# Patient Record
Sex: FEMALE | Race: WHITE
[De-identification: names, ages, dates, MRNs, and addresses within clinical notes are randomized per-mention and may not be internally consistent; named-entity substitution may affect disease eponyms.]

---

## 2019-07-13 ENCOUNTER — HOSPITAL ENCOUNTER (OUTPATIENT)
Dept: HOSPITAL 5 - TRG | Age: 24
Discharge: HOME | End: 2019-07-13
Attending: OBSTETRICS & GYNECOLOGY
Payer: COMMERCIAL

## 2019-07-13 VITALS — DIASTOLIC BLOOD PRESSURE: 71 MMHG | SYSTOLIC BLOOD PRESSURE: 115 MMHG

## 2019-07-13 DIAGNOSIS — O21.2: ICD-10-CM

## 2019-07-13 DIAGNOSIS — Z3A.39: ICD-10-CM

## 2019-07-13 DIAGNOSIS — O36.8130: ICD-10-CM

## 2019-07-13 DIAGNOSIS — O26.853: Primary | ICD-10-CM

## 2019-07-13 LAB
BASOPHILS # (AUTO): 0 K/MM3 (ref 0–0.1)
BASOPHILS NFR BLD AUTO: 0.3 % (ref 0–1.8)
EOSINOPHIL # BLD AUTO: 0.1 K/MM3 (ref 0–0.4)
EOSINOPHIL NFR BLD AUTO: 0.8 % (ref 0–4.3)
HCT VFR BLD CALC: 39.8 % (ref 30.3–42.9)
HGB BLD-MCNC: 13.5 GM/DL (ref 10.1–14.3)
LYMPHOCYTES # BLD AUTO: 1.5 K/MM3 (ref 1.2–5.4)
LYMPHOCYTES NFR BLD AUTO: 14.3 % (ref 13.4–35)
MCH RBC QN AUTO: 30 PG (ref 28–32)
MCHC RBC AUTO-ENTMCNC: 34 % (ref 30–34)
MCV RBC AUTO: 88 FL (ref 79–97)
MONOCYTES # (AUTO): 0.7 K/MM3 (ref 0–0.8)
MONOCYTES % (AUTO): 6.5 % (ref 0–7.3)
PLATELET # BLD: 216 K/MM3 (ref 140–440)
RBC # BLD AUTO: 4.55 M/MM3 (ref 3.65–5.03)

## 2019-07-13 PROCEDURE — 86850 RBC ANTIBODY SCREEN: CPT

## 2019-07-13 PROCEDURE — 76815 OB US LIMITED FETUS(S): CPT

## 2019-07-13 PROCEDURE — 86592 SYPHILIS TEST NON-TREP QUAL: CPT

## 2019-07-13 PROCEDURE — 36415 COLL VENOUS BLD VENIPUNCTURE: CPT

## 2019-07-13 PROCEDURE — 76819 FETAL BIOPHYS PROFIL W/O NST: CPT

## 2019-07-13 PROCEDURE — 96360 HYDRATION IV INFUSION INIT: CPT

## 2019-07-13 PROCEDURE — 86901 BLOOD TYPING SEROLOGIC RH(D): CPT

## 2019-07-13 PROCEDURE — 85730 THROMBOPLASTIN TIME PARTIAL: CPT

## 2019-07-13 PROCEDURE — 86900 BLOOD TYPING SEROLOGIC ABO: CPT

## 2019-07-13 PROCEDURE — 85025 COMPLETE CBC W/AUTO DIFF WBC: CPT

## 2019-07-13 PROCEDURE — 96361 HYDRATE IV INFUSION ADD-ON: CPT

## 2019-07-13 PROCEDURE — 59020 FETAL CONTRACT STRESS TEST: CPT

## 2019-07-13 NOTE — ULTRASOUND REPORT
Limited obstetrical ultrasound



INDICATION: Term pregnancy, nausea, vomiting



Limited evaluation shows an intrauterine term pregnancy. Fetus is in a cephalic position. Cardiac act
ivity was documented at 158 bpm needle heart rate. Placenta is fundal and free of the internal cervic
al os. No obvious placental abnormalities are seen. No evidence of placental reduction is noted. Amni
otic fluid volume appears acceptable for this stage of pregnancy. DONN is within normal range at 9.0 c
m. Fetal aging was not performed and I do not have a full survey of anatomical details.



Fetal biophysical profile, nonstress



All components were within normal limits with fetal biophysical profile score of 8/8.



IMPRESSION: Term pregnancy without obvious abnormality seen on limited study



Signer Name: Bc Carson MD 

Signed: 7/13/2019 1:33 PM

 Workstation Name: [id number]

## 2019-07-13 NOTE — ULTRASOUND REPORT
Limited obstetrical ultrasound



INDICATION: Term pregnancy, nausea, vomiting



Limited evaluation shows an intrauterine term pregnancy. Fetus is in a cephalic position. Cardiac act
ivity was documented at 158 bpm needle heart rate. Placenta is fundal and free of the internal cervic
al os. No obvious placental abnormalities are seen. No evidence of placental reduction is noted. Amni
otic fluid volume appears acceptable for this stage of pregnancy. DONN is within normal range at 9.0 c
m. Fetal aging was not performed and I do not have a full survey of anatomical details.



Fetal biophysical profile, nonstress



All components were within normal limits with fetal biophysical profile score of 8/8.



IMPRESSION: Term pregnancy without obvious abnormality seen on limited study



Signer Name: Bc Carson MD 

Signed: 7/13/2019 1:33 PM

 Workstation Name: [id number]

## 2019-07-14 ENCOUNTER — HOSPITAL ENCOUNTER (INPATIENT)
Dept: HOSPITAL 5 - TRG | Age: 24
LOS: 2 days | Discharge: HOME | End: 2019-07-16
Attending: OBSTETRICS & GYNECOLOGY | Admitting: OBSTETRICS & GYNECOLOGY
Payer: COMMERCIAL

## 2019-07-14 DIAGNOSIS — Z23: ICD-10-CM

## 2019-07-14 DIAGNOSIS — Z79.899: ICD-10-CM

## 2019-07-14 DIAGNOSIS — Z3A.39: ICD-10-CM

## 2019-07-14 DIAGNOSIS — K21.9: ICD-10-CM

## 2019-07-14 DIAGNOSIS — R00.0: ICD-10-CM

## 2019-07-14 DIAGNOSIS — Z88.0: ICD-10-CM

## 2019-07-14 DIAGNOSIS — E66.9: ICD-10-CM

## 2019-07-14 LAB
HCT VFR BLD CALC: 37.9 % (ref 30.3–42.9)
HGB BLD-MCNC: 12.8 GM/DL (ref 10.1–14.3)
MCHC RBC AUTO-ENTMCNC: 34 % (ref 30–34)
MCV RBC AUTO: 86 FL (ref 79–97)
PLATELET # BLD: 211 K/MM3 (ref 140–440)
RBC # BLD AUTO: 4.4 M/MM3 (ref 3.65–5.03)

## 2019-07-14 PROCEDURE — 86850 RBC ANTIBODY SCREEN: CPT

## 2019-07-14 PROCEDURE — 36415 COLL VENOUS BLD VENIPUNCTURE: CPT

## 2019-07-14 PROCEDURE — 90715 TDAP VACCINE 7 YRS/> IM: CPT

## 2019-07-14 PROCEDURE — 86901 BLOOD TYPING SEROLOGIC RH(D): CPT

## 2019-07-14 PROCEDURE — 85014 HEMATOCRIT: CPT

## 2019-07-14 PROCEDURE — 3E0R3BZ INTRODUCTION OF ANESTHETIC AGENT INTO SPINAL CANAL, PERCUTANEOUS APPROACH: ICD-10-PCS | Performed by: OBSTETRICS & GYNECOLOGY

## 2019-07-14 PROCEDURE — 00HU33Z INSERTION OF INFUSION DEVICE INTO SPINAL CANAL, PERCUTANEOUS APPROACH: ICD-10-PCS | Performed by: OBSTETRICS & GYNECOLOGY

## 2019-07-14 PROCEDURE — 86900 BLOOD TYPING SEROLOGIC ABO: CPT

## 2019-07-14 PROCEDURE — 85027 COMPLETE CBC AUTOMATED: CPT

## 2019-07-14 PROCEDURE — 85018 HEMOGLOBIN: CPT

## 2019-07-14 PROCEDURE — 90471 IMMUNIZATION ADMIN: CPT

## 2019-07-14 PROCEDURE — 86592 SYPHILIS TEST NON-TREP QUAL: CPT

## 2019-07-14 PROCEDURE — 0HQ9XZZ REPAIR PERINEUM SKIN, EXTERNAL APPROACH: ICD-10-PCS | Performed by: OBSTETRICS & GYNECOLOGY

## 2019-07-14 PROCEDURE — 88307 TISSUE EXAM BY PATHOLOGIST: CPT

## 2019-07-14 RX ADMIN — SODIUM CHLORIDE, SODIUM LACTATE, POTASSIUM CHLORIDE, AND CALCIUM CHLORIDE SCH MLS/HR: .6; .31; .03; .02 INJECTION, SOLUTION INTRAVENOUS at 06:40

## 2019-07-14 RX ADMIN — SODIUM CHLORIDE, SODIUM LACTATE, POTASSIUM CHLORIDE, AND CALCIUM CHLORIDE SCH MLS/HR: .6; .31; .03; .02 INJECTION, SOLUTION INTRAVENOUS at 07:49

## 2019-07-14 RX ADMIN — IBUPROFEN SCH MG: 600 TABLET, FILM COATED ORAL at 23:48

## 2019-07-14 RX ADMIN — IBUPROFEN SCH MG: 600 TABLET, FILM COATED ORAL at 17:51

## 2019-07-14 RX ADMIN — DOCUSATE SODIUM SCH MG: 100 CAPSULE, LIQUID FILLED ORAL at 22:01

## 2019-07-14 NOTE — ANESTHESIA CONSULTATION
Anesthesia Consult and Med Hx


Date of service: 07/14/19





- Airway


Anesthetic Teeth Evaluation: Good


ROM Head & Neck: Adequate


Mental/Hyoid Distance: Adequate


Mallampati Class: Class II


Intubation Access Assessment: Probably Good





- Pulmonary Exam


CTA: Yes





- Cardiac Exam


Cardiac Exam: RRR





- Pre-Operative Health Status


ASA Pre-Surgery Classification: ASA2


Proposed Anesthetic Plan: Epidural





- Pulmonary


Hx Smoking: No


Hx Asthma: No


Hx Respiratory Symptoms: No


SOB: No


COPD: No


Home Oxygen Therapy: No


Hx Pneumonia: No


Hx Sleep Apnea: No





- Cardiovascular System


Hx Hypertension: No


Hx Coronary Artery Disease: No


Hx Heart Attack/AMI: No


Hx Angina: No


Hx Percutaneous Transluminal Coronary Angioplasty (PTCA): No


Hx Cardia Arrhythmia: No


Hx Pacemaker: No


Hx Internal Defibrillator: No


Hx Valvular Heart Disease: No


Hx Heart Murmur: No


Hx Peripheral Vascular Disease: No





- Central Nervous System


Hx Neuromuscular Disorder: No


Hx Seizures: No


CVA: No


Hx Back Pain: No


Hx Psychiatric Problems: No





- Gastrointestinal


Hx Ulcer: No


Hx Gastroesophageal Reflux Disease: Yes





- Endocrine


Hx Renal Disease: No


Hx End Stage Renal Disease: No


Hx Cirrhosis: No


Hx Liver Disease: No


Hx Insulin Dependent Diabetes: No


Hx Non-Insulin Dependent Diabetes: No


Hx Thyroid Disease: No


Hx Hypothyroidism: No


Hx Hyperthyroidism: No





- Hematic


Hx Anemia: No


Hx Sickle Cell Disease: No





- Other Systems


Hx Alcohol Use: No


Hx Substance Use: No


Hx Cancer: No


Hx Obesity: Yes (BMI 34.4)

## 2019-07-14 NOTE — PROCEDURE NOTE
OB Delivery Note





- Delivery


Date of Delivery: 19


Surgeon: BALJINDER BARKLEY


Estimated blood loss: other (350cc)





- Vaginal


Delivery presentation: vertex


Delivery position: OA


Intrapartum events: meconium


Delivery augmentation: rupture of membranes, pitocin


Delivery monitor: external FHT, external uterine


Route of delivery: 


Delivery placenta: spontaneous


Delivery cord: 3 umbilical vessels


Episiotomy: none


Delivery laceration: 1st degree


Delivery repair: vicryl


Anesthesia: epidural


Delivery comments: 





Patient was noted to be c/c/+2 Patient commenced to pushing a viable female 

infant at 1059 with apgars 6 and 7. Placenta delivered intact with three vessel 

cord at 1110. Small first degree reparied with 2-0 vicryl . The weight of the 

baby 8 pounds 10 oz.  cc. Patient tolerated procedure well.  








- Infant


  ** A


Apgar at 1 minute: 6


Apgar at 5 minutes: 7


Infant Gender: Female (8 pounds 10 oz)

## 2019-07-14 NOTE — HISTORY AND PHYSICAL REPORT
History of Present Illness


Date of examination: 19


Date of admission: 


19 06:46





Chief complaint: 





contractions 





History of present illness: 





This is a 25 yo  at 39 weeks admitted for active labor. Patient is a patient

of [place]. Records reviewed 





Past History


Past Medical History: no pertinent history


Past Surgical History: no surgical history


Family/Genetic History: none


Social history: single.  denies: smoking, alcohol abuse, prescription drug abuse





- Obstetrical History


Expected Date of Delivery: 19


Actual Gestation: 39 Week(s) 2 Day(s) 


: 1


Para: 0


Hx # Term Pregnancies: 0


Number of  Pregnancies: 0


Spontaneous Abortions: 0


Induced : 0


Number of Living Children: 0





Medications and Allergies


                                    Allergies











Allergy/AdvReac Type Severity Reaction Status Date / Time


 


Penicillins Allergy  Itching Verified 19 06:15











                                Home Medications











 Medication  Instructions  Recorded  Confirmed  Last Taken  Type


 


Prenatal Vit-Fe Fumar-FA [Prenatal 1 tab PO DAILY 19 

History





Vitamin]     











Active Meds: 


Active Medications





Ephedrine Sulfate (Ephedrine Sulfate)  10 mg IV Q2M PRN


   PRN Reason: Hypotension


Fentanyl (Sublimaze)  100 mcg IV Q2H PRN


   PRN Reason: Labor Pain


Oxytocin/Sodium Chloride (Pitocin/Ns 20 Unit/1000ml Drip)  20 units in 1,000 mls

@ 125 mls/hr IV AS DIRECT EVARISTO


Clindamycin HCl (Cleocin 900 Mg/50 Ml)  900 mg in 50 mls @ 100 mls/hr IV Q8HR 

EVARISTO; Protocol


   Last Admin: 19 07:49 Dose:  100 mls/hr


   Documented by: 


Lactated Ringer's (Lactated Ringers)  1,000 mls @ 125 mls/hr IV AS DIRECT VEARISTO


   Last Admin: 19 07:49 Dose:  125 mls/hr


   Documented by: 


Oxytocin/Sodium Chloride (Pitocin/Ns 30 Unit/500ml)  30 units in 500 mls @ 1 

mls/hr IV TITR EVARISTO; Protocol


   Last Admin: 19 07:50 Dose:  2 milliunits/min, 2 mls/hr


   Documented by: 


Oxytocin/Sodium Chloride (Pitocin/Ns 30 Unit/500ml)  30 units in 500 mls @ 2 

mls/hr IV TITR EVARISTO; Protocol


Fentanyl/Bupivacaine/Sodium Chlor (Fentanyl-Bupiv 2 Mcg/Ml-0.125%)  200 mcg in 

100 mls @ 12 mls/hr EPIDURAL TITR EVARISTO; Protocol


Mineral Oil (Mineral Oil)  30 ml PO QHS PRN


   PRN Reason: Constipation


Naloxone HCl (Narcan 2 Mg/2 Ml)  0.2 mg IV Q5M PRN


   PRN Reason: Respiratory sedation


Terbutaline Sulfate (Brethine)  0.25 mg SUB-Q ONCE PRN


   PRN Reason: Hyperstimulation/Hypertonicity


Terbutaline Sulfate (Brethine)  0.25 mg IVP ONCE PRN


   PRN Reason: Hyperstimulation/Hypertonicity











Review of Systems


All systems: negative


Genitourinary: contractions





- Vital Signs


Vital signs: 


                                   Vital Signs











Pulse BP


 


 80   130/76 


 


 19 06:07  19 06:07








                                        











Temp Pulse Resp BP Pulse Ox


 


 97.9 F   89   18   103/55   98 


 


 19 09:36  19 09:38  19 06:08  19 09:38  19 09:38














- Physical Exam


Breasts: Positive: normal


Cardiovascular: Regular rate, Normal S1


Lungs: Positive: Clear to auscultation, Normal air movement


Abdomen: Positive: normal appearance, soft, normal bowel sounds.  Negative: 

distention, tenderness, guarding


Genitourinary (Female): Positive: normal external genitalia, normal perenium


Vagina: Positive: normal moisture


Uterus: Positive: normal size, normal contour


Anus/Rectum: Positive: normal perianal skin


Extremities: Positive: normal


Deep Tendon Reflex Grade: Normal +2





- Obstetrical


FHR: category 1


Cervical Dilatation: 9


Cervical Effacement Percentage: 100


Fetal station: -1


Uterine Contraction Pattern: Regular


Uterine Tone Measurement Phase: Contraction


Uterine Contraction Intensity: Moderate





Results


Result Diagrams: 


                                 19 06:40





                              Abnormal lab results











  19 Range/Units





  06:40 


 


WBC  12.4 H  (4.5-11.0)  K/mm3


 


RDW  15.3 H  (13.2-15.2)  %








All other labs normal.








Assessment and Plan


A/P 


IUP 39+2 weeks


GBS unobtainable -amp intiated


active labor


IVF, labs


expect vaginal delivery

## 2019-07-15 LAB
HCT VFR BLD CALC: 31.3 % (ref 30.3–42.9)
HGB BLD-MCNC: 10.6 GM/DL (ref 10.1–14.3)

## 2019-07-15 PROCEDURE — 3E0234Z INTRODUCTION OF SERUM, TOXOID AND VACCINE INTO MUSCLE, PERCUTANEOUS APPROACH: ICD-10-PCS | Performed by: OBSTETRICS & GYNECOLOGY

## 2019-07-15 RX ADMIN — IBUPROFEN SCH MG: 600 TABLET, FILM COATED ORAL at 17:37

## 2019-07-15 RX ADMIN — Medication SCH EACH: at 09:54

## 2019-07-15 RX ADMIN — IBUPROFEN SCH MG: 600 TABLET, FILM COATED ORAL at 12:35

## 2019-07-15 RX ADMIN — IBUPROFEN SCH MG: 600 TABLET, FILM COATED ORAL at 23:42

## 2019-07-15 RX ADMIN — DOCUSATE SODIUM SCH MG: 100 CAPSULE, LIQUID FILLED ORAL at 21:55

## 2019-07-15 RX ADMIN — IBUPROFEN SCH MG: 600 TABLET, FILM COATED ORAL at 05:10

## 2019-07-15 RX ADMIN — DOCUSATE SODIUM SCH MG: 100 CAPSULE, LIQUID FILLED ORAL at 09:54

## 2019-07-15 NOTE — PROGRESS NOTE
Assessment and Plan





A: PPD#1 s/p  at term, Intermittent tachycardia 


P: Routine care. Monitor tachycardia. Closely monitor clinical status. 





Subjective





- Subjective


Date of service: 07/15/19


Principal diagnosis: s/p  at term 


Interval history: 





Pt without complaints this morning. Denies severe pain. 


Patient reports: appetite normal, voiding normally, pain well controlled, 

ambulating normally


[place]: doing well





Objective





- Vital Signs


Latest vital signs: 


                                   Vital Signs











  Temp Pulse Resp BP BP Pulse Ox


 


 07/15/19 05:10    18   


 


 19 23:48    20   


 


 19 23:24  98.2 F  110 H  20  119/69   94


 


 19 20:40  98.2 F  95 H  20  114/82   100


 


 19 17:51    18   


 


 19 16:49  99.3 F  102 H  16  120/74   97


 


 19 13:30  98.8 F  119 H  22   128/69  99


 


 19 11:03   117 H     98


 


 19 10:58   123 H     98


 


 19 10:53   99 H     91


 


 19 10:48   100 H     98


 


 19 10:47   92 H     88


 


 19 10:43   108 H     98


 


 19 10:39   123 H   142/67  


 


 19 10:38   112 H     99


 


 19 10:33   107 H     79 L


 


 19 10:28   111 H     98


 


 19 10:23   106 H   122/58   96


 


 19 10:18   111 H     96


 


 19 10:13   109 H     98


 


 19 10:09   106 H   128/63  


 


 19 10:08   106 H     97


 


 19 10:03   112 H     97


 


 19 09:58   108 H     95


 


 19 09:57   95 H     94


 


 19 09:53   85   105/54   99


 


 19 09:48   99 H     100


 


 19 09:43   110 H     100


 


 19 09:38   89   103/55   98


 


 19 09:36  97.9 F  94 H   108/55  


 


 19 09:34   100 H   103/57  


 


 19 09:33   102 H     100


 


 19 09:32   93 H   105/56  


 


 19 09:30   93 H   106/57  


 


 19 09:28   98 H   108/58   100


 


 19 09:26   110 H   100/56  


 


 19 09:24   106 H   106/58  


 


 19 09:23   101 H     100


 


 19 09:22   82   109/54  


 


 19 09:20   110 H   102/56  


 


 19 09:18   95 H   110/59   99


 


 19 09:16   93 H   110/56  


 


 19 09:14   109 H   108/55  


 


 19 09:13   104 H     100


 


 19 09:12   106 H   118/56  


 


 19 09:10   110 H   112/58  


 


 19 09:08   111 H   117/55   98


 


 19 09:06   96 H   103/55  


 


 19 09:04   107 H   108/51  


 


 19 09:03   107 H     98


 


 19 09:02   102 H   113/53  


 


 19 09:00   113 H   110/59  


 


 19 08:58   113 H   116/57   98


 


 19 08:56   122 H   97/52  


 


 19 08:54   116 H   93/51  


 


 19 08:53   116 H     98


 


 19 08:52   93 H   103/56  


 


 19 08:50   97 H   101/57  


 


 19 08:48   103 H   91/54   100


 


 19 08:46   90   91/51  


 


 19 08:44   85   80/45  


 


 19 08:43   86     96


 


 19 08:42   86   86/49  


 


 19 08:41   106 H     94


 


 19 08:39   102 H   106/57  


 


 19 08:38   106 H   97/70   96


 


 19 08:36   96 H   121/69  


 


 19 08:34   91 H   118/69  


 


 19 08:33   91 H     96


 


 19 08:32   90   113/62  


 


 19 08:30   86   126/78  








                                Intake and Output











 07/14/19 07/15/19 07/15/19





 22:59 06:59 14:59


 


Intake Total 240 120 


 


Output Total 1750 600 


 


Balance -1510 -480 


 


Intake:   


 


  Oral 240 120 


 


Output:   


 


  Urine 1750 600 


 


    Void 1750 600 


 


Other:   


 


  Total, Intake Amount 240 120 


 


  Total, Output Amount 800 600 


 


  # Voids   


 


    Void 2 3 














- Exam


Breasts: Present: deferred


Cardiovascular: Present: Regular rate


Lungs: Present: Clear to auscultation


Abdomen: Present: soft (obese )


Uterus: Present: fundal height at umbilicus


Extremities: Present: edema (trace)

## 2019-07-16 VITALS — DIASTOLIC BLOOD PRESSURE: 72 MMHG | SYSTOLIC BLOOD PRESSURE: 105 MMHG

## 2019-07-16 RX ADMIN — DOCUSATE SODIUM SCH MG: 100 CAPSULE, LIQUID FILLED ORAL at 09:40

## 2019-07-16 RX ADMIN — IBUPROFEN SCH MG: 600 TABLET, FILM COATED ORAL at 05:39

## 2019-07-16 RX ADMIN — Medication SCH EACH: at 09:40

## 2019-07-16 NOTE — DISCHARGE SUMMARY
Providers





- Providers


Date of Admission: 


19 06:46





Date of discharge: 19


Attending physician: 


BALJINDER CROWELL MD





Primary care physician: 


BALJINDER CROWELL MD








Hospitalization


Reason for admission: active labor


Delivery: 


Procedure details: 





Please see delivery note. 


Laceration: 1st degree


Incision: normal


Other postpartum procedures: none


Postpartum complications: none


Discharge diagnosis: IUP at term delivered


[place] baby: female


Hospital course: 





Pt was admitted in active labor and went on to have a spontaneous vaginal 

delivery which she tolerated well. Her postpartum course was uncomplicated and 

she met discharge criteria on PPD#2. She will follow up with Dr Crowell in 4 wks. 


Condition at discharge: Stable


Disposition: DC-01 TO HOME OR SELFCARE





- Discharge Diagnoses


(1) Term birth of female 


Status: Acute   





(2) Obesity


Status: Acute   


Qualifiers: 


   Obesity classification: adult class 1 (BMI 30 - 34.9)   Serious obesity 

comorbidity presence: unspecified whether serious comorbidity present   Body 

mass index: BMI 34.0-34.9 





Plan





- Discharge Medications


Prescriptions: 


Ferrous Sulfate [Feosol 325 MG tab] 325 mg PO QDAY #30 tablet


Ibuprofen [Motrin] 600 mg PO Q8H PRN #30 tablet


 PRN Reason: Pain


oxyCODONE /ACETAMINOPHEN [Percocet 5/325] 1 tab PO Q6HR PRN #20 tablet


 PRN Reason: Pain





- Provider Discharge Summary


Activity: routine, no sex for 6 weeks, no heavy lifting 4 weeks, no strenuous 

exercise


Diet: routine


Instructions: routine


Additional instructions: 


[]  Smoking cessation referral if applicable(refer to patient education folder 

for contact #)


[]  Refer to [facility]'s [facility] Booklet








Call your doctor immediately for:


* Fever > 100.5


* Heavy vaginal bleeding ( >1 pad per hour)


* Severe persistent headache


* Shortness of breath


* Reddened, hot, painful area to leg or breast


* Drainage or odor from incision.





* Keep incision clean and dry at all times and follow doctor's instructions 

regarding bathing/showering











- Follow up plan


Follow up: 


BALJINDER CROWELL MD [Primary Care Provider] - 19 (Please call to schedule a

postpartum appt )

## 2019-07-16 NOTE — PROGRESS NOTE
Assessment and Plan





A: PPD#2 s/p  at term, Intermittent tachycardia- resolved 


P: Routine care. Discharge today with follow up in 4 wks with Dr Crowell. 





Subjective





- Subjective


Date of service: 19


Principal diagnosis: s/p  at term 


Interval history: 


No overnight events.  


Patient reports: appetite normal, voiding normally, pain well controlled, 

ambulating normally


[place]: doing well





Objective





- Vital Signs


Latest vital signs: 


                                   Vital Signs











  Temp Pulse Resp BP BP Pulse Ox


 


 19 05:39    18   


 


 07/15/19 23:45  98.5 F  89  18  124/77   97


 


 07/15/19 23:42    20   


 


 07/15/19 16:08  98.4 F  89  20   113/71 


 


 07/15/19 08:36  97.9 F  82  20   104/60 








                                Intake and Output











 07/15/19 07/16/19 07/16/19





 22:59 06:59 14:59


 


Intake Total 360 840 


 


Balance 360 840 


 


Intake:   


 


  Oral 360  


 


  Intake, Free Water  840 


 


Other:   


 


  Total, Intake Amount 360  


 


  # Voids   


 


    Void 1 3 














- Exam


Breasts: Present: deferred


Cardiovascular: Present: Regular rate


Lungs: Present: Clear to auscultation


Abdomen: Present: soft (obese)


Uterus: Present: fundal height below umbilicus


Extremities: Present: edema (trace)